# Patient Record
Sex: MALE | Race: WHITE | NOT HISPANIC OR LATINO | URBAN - METROPOLITAN AREA
[De-identification: names, ages, dates, MRNs, and addresses within clinical notes are randomized per-mention and may not be internally consistent; named-entity substitution may affect disease eponyms.]

---

## 2017-11-15 NOTE — PATIENT DISCUSSION
PATIENT HAD REACTION TO IOP CHECK (PASSED OUT AND VOMITED) COULD NOT COMPLETE VISIT TODAY. WILL COME BACK FOR SLIT LAMP EXAM AT ANOTHER TIME.

## 2022-06-29 ENCOUNTER — NEW PATIENT (OUTPATIENT)
Dept: URBAN - METROPOLITAN AREA CLINIC 14 | Facility: CLINIC | Age: 52
End: 2022-06-29

## 2022-06-29 DIAGNOSIS — H02.831: ICD-10-CM

## 2022-06-29 DIAGNOSIS — H02.835: ICD-10-CM

## 2022-06-29 DIAGNOSIS — L98.8: ICD-10-CM

## 2022-06-29 DIAGNOSIS — H02.832: ICD-10-CM

## 2022-06-29 DIAGNOSIS — H02.834: ICD-10-CM

## 2022-06-29 PROCEDURE — 99211NC NO CHARGE VISIT

## 2022-06-29 ASSESSMENT — VISUAL ACUITY
OS_SC: 20/40
OD_SC: 20/25-1

## 2022-08-10 ENCOUNTER — POST-OP (OUTPATIENT)
Dept: URBAN - METROPOLITAN AREA CLINIC 14 | Facility: CLINIC | Age: 52
End: 2022-08-10

## 2022-08-10 DIAGNOSIS — L98.8: ICD-10-CM

## 2022-08-10 DIAGNOSIS — H02.832: ICD-10-CM

## 2022-08-10 DIAGNOSIS — Z98.890: ICD-10-CM

## 2022-08-10 DIAGNOSIS — H02.835: ICD-10-CM

## 2022-08-10 PROCEDURE — 99024 POSTOP FOLLOW-UP VISIT: CPT

## 2022-08-10 ASSESSMENT — VISUAL ACUITY
OS_SC: 20/40
OD_SC: 20/25-1

## 2022-10-21 ENCOUNTER — POST-OP (OUTPATIENT)
Dept: URBAN - METROPOLITAN AREA CLINIC 14 | Facility: CLINIC | Age: 52
End: 2022-10-21

## 2022-10-21 DIAGNOSIS — Z98.890: ICD-10-CM

## 2022-10-21 DIAGNOSIS — H02.832: ICD-10-CM

## 2022-10-21 DIAGNOSIS — H02.835: ICD-10-CM

## 2022-10-21 DIAGNOSIS — L98.0: ICD-10-CM

## 2022-10-21 PROCEDURE — 99024 POSTOP FOLLOW-UP VISIT: CPT

## 2022-10-21 ASSESSMENT — VISUAL ACUITY
OS_SC: 20/40
OD_SC: 20/25-1

## 2025-03-04 ENCOUNTER — COMPREHENSIVE EXAM (OUTPATIENT)
Age: 55
End: 2025-03-04

## 2025-03-04 DIAGNOSIS — H52.4: ICD-10-CM

## 2025-03-04 DIAGNOSIS — H10.45: ICD-10-CM

## 2025-03-04 PROCEDURE — 92014 COMPRE OPH EXAM EST PT 1/>: CPT

## 2025-03-04 PROCEDURE — 92015 DETERMINE REFRACTIVE STATE: CPT
